# Patient Record
Sex: MALE | Race: AMERICAN INDIAN OR ALASKA NATIVE | NOT HISPANIC OR LATINO | Employment: FULL TIME | URBAN - METROPOLITAN AREA
[De-identification: names, ages, dates, MRNs, and addresses within clinical notes are randomized per-mention and may not be internally consistent; named-entity substitution may affect disease eponyms.]

---

## 2020-10-31 ENCOUNTER — NURSE TRIAGE (OUTPATIENT)
Dept: ADMINISTRATIVE | Facility: CLINIC | Age: 25
End: 2020-10-31

## 2020-10-31 NOTE — TELEPHONE ENCOUNTER
"  Reason for Disposition   Large blisters in mouth (i.e., fluid filled bubbles or sacs)    Additional Information   Negative: [1] Looks like fever blisters ("cold sore") AND [2] only on outer lip   Negative: Generalized skin rash from Chickenpox   Negative: Chemical in the mouth suspected cause of ulcers   Negative: [1] Drinking very little AND [2] dehydration suspected (e.g., no urine > 12 hours, very dry mouth, very lightheaded)   Negative: Generalized rash on body   Negative: Patient sounds very sick or weak to the triager    Protocols used: MOUTH ULCERS-A-AH  Pt states that he wants med to help mouth. Pt having pain in mouth and cant eat. having pain of tongue and gums. T100. Ulcers or blisters in mouth. Able to still drink fluids. good uop. Pt seen yest in ED for herpes. rec [facility] today. Pt agrees. Call back with questions.   "

## 2021-05-04 ENCOUNTER — PATIENT MESSAGE (OUTPATIENT)
Dept: RESEARCH | Facility: HOSPITAL | Age: 26
End: 2021-05-04